# Patient Record
Sex: MALE | Race: OTHER | Employment: OTHER | URBAN - METROPOLITAN AREA
[De-identification: names, ages, dates, MRNs, and addresses within clinical notes are randomized per-mention and may not be internally consistent; named-entity substitution may affect disease eponyms.]

---

## 2017-08-15 NOTE — PROCEDURE NOTE: CLINICAL
PROCEDURE NOTE: Avastin 1.25mg OD. Diagnosis: Neovascular Glaucoma, Severe. Anesthesia: Topical. Prep: Betadine Drops. Prior to injection, risks/benefits/alternatives discussed including infection, loss of vision, hemorrhage, cataract, glaucoma, retinal tears or detachment. The off-label status of Intravitreal Avastin also was reviewed. The patient wished to proceed with treatment. Topical anesthesia was induced with Alcaine. Additional anesthesia was achieved using drop(s) or injection checked above. A drop of Povidone-iodine 5% ophthalmic solution was instilled over the injection site and in the inferior fornix. Betadine prep was performed. Using the syringe provided, Avastin 1.25 mg in 0.05 cc was injected into the vitreous cavity. The needle was passed 3.0 mm posterior to the limbus in pseudophakic patients, and 3.5 mm posterior to the limbus in phakic patients. Patient tolerated procedure well. There were no complications. Injection time: 4:59. Lot #: [email or web address]. Expiration Date: Tue Oct 03 2017 00:00:00 [facility]-[address] Daylight Time). Inventory Id: null. Post-op instructions given. The patient was instructed to return for re-evaluation in approximately 4-12 weeks depending on his/her condition and was told to call immediately if vision decreases and/or if his/her eye becomes red, painful, and/or light sensitive. The patient was instructed to go to the emergency room or call 911 if unable to reach the doctor within an hour or two of trying or calling. The patient was instructed to use Artificial Tears q.i.d. p.r.n for comfort. Francisca Herrera

## 2017-08-15 NOTE — PATIENT DISCUSSION
PATIENT UNDERSTANDS THAT AXIAL LENGHT MEASUREMENTS ARE APPROXIMATE DUE TO CONDITION OF HER EYE, SHE MAY NEED CORRECTIVE GLASSES, AND IN SOME CASES CHANGE OF IOL .

## 2017-08-15 NOTE — PATIENT DISCUSSION
Based on today's exam, diagnostic studies, and/or review of records, the determination was made for treatment today. AVASTIN 1.25mg recommended TODAY after discussion of benefits, risks and alternatives. The injection was given without complication and tolerated well by the patient. Discussed risks, benefits, and alternatives of Intravitreal Avastin. Patient understands and accepts that the Avastin is being used off label. Patient understand and accepts that the Avastin comes from a formulating pharmacy, which may increase the risk of sever vision threatening intraocular infections. Post-injection instructions were reviewed and understood by the patient. Procedure was performed without complications. Instructed to call immediately if any new distortion, blurring, decreased vision or eye pain.

## 2017-08-25 NOTE — PROCEDURE NOTE: CLINICAL
PROCEDURE NOTE: A/C Paracentesis, Therapeutic OD. Diagnosis: Neovascular Glaucoma, Severe. Prior to procedure, risks/benefits/alternatives discussed including infection, loss of vision, hemorrhage, cataract, retinal tears or detachment and patient wished to proceed. All questions asked by the patient were answered in detail. Betadine prep was performed. Location of Paracentesis: Temporal Limbus. Volume of tap: 0.2* ml. Patient tolerated procedure well. There were no complications. Anti-biotic drops were then instilled into the post-operative eye. Post procedure IOP = 5* mmHg. Post procedure instructions given. Patient given office phone number/answering service number and advised to call immediately should there be an increase in floaters or redness, loss of vision or pain, or should they have any other questions or concerns. Hira Rogers PROCEDURE NOTE: Avastin () OD. Diagnosis: PDR. Prior to injection, risks/benefits/alternatives discussed including infection, loss of vision, hemorrhage, cataract, glaucoma, retinal tears or detachment. The off-label status of Intravitreal Avastin also was reviewed. The patient wished to proceed with treatment. Topical anesthesia was induced with Alcaine. Additional anesthesia was achieved using drop(s) or injection checked above. a drop of Povidone-iodine 5% ophthalmic solution was instilled over the injection site and in the inferior fornix. Betadine prep was performed. Using the syringe provided, Avastin 1.25 mg in 0.05 cc was injected into the vitreous cavity. The needle was passed 3.0 mm posterior to the limbus in pseudophakic patients, and 3.5 mm posterior to the lumbus in phakic patients. The remainder of the Avastin in the single-use vial was then discarded in a medical waste disposal container. Unused medication was discarded. Patient tolerated procedure well. There were no complications. Injection time: 4:42PM. Post-op instructions given. The patient was instructed to return for re-evaluation in approximately 4-12 weeks depending on his/her condition and was told to call immediately if vision decreases and/or if his/her eye becomes red, painful, and/or light sensitive. The patient was instructed to go to the emergency room or call 911 if unable to reach the doctor within an hour or two of trying or calling. The patient was instructed to use Artificial Tears q.i.d. p.r.n for comfort. Hira Rogers

## 2018-09-11 ENCOUNTER — NEW PATIENT COMPREHENSIVE (OUTPATIENT)
Dept: URBAN - METROPOLITAN AREA CLINIC 47 | Facility: CLINIC | Age: 63
End: 2018-09-11

## 2018-09-11 DIAGNOSIS — H04.123: ICD-10-CM

## 2018-09-11 DIAGNOSIS — H18.51: ICD-10-CM

## 2018-09-11 DIAGNOSIS — H26.493: ICD-10-CM

## 2018-09-11 DIAGNOSIS — H18.59: ICD-10-CM

## 2018-09-11 DIAGNOSIS — H43.393: ICD-10-CM

## 2018-09-11 PROCEDURE — G9903 PT SCRN TBCO ID AS NON USER: HCPCS

## 2018-09-11 PROCEDURE — 92015 DETERMINE REFRACTIVE STATE: CPT

## 2018-09-11 PROCEDURE — 92134 CPTRZ OPH DX IMG PST SGM RTA: CPT

## 2018-09-11 PROCEDURE — G8428 CUR MEDS NOT DOCUMENT: HCPCS

## 2018-09-11 PROCEDURE — 1036F TOBACCO NON-USER: CPT

## 2018-09-11 PROCEDURE — 92004 COMPRE OPH EXAM NEW PT 1/>: CPT

## 2018-09-11 ASSESSMENT — VISUAL ACUITY
OS_SC: <J12
OD_CC: 20/40-1
OS_CC: 20/40
OD_PH: 20/40
OD_CC: J2
OS_CC: J2
OS_PH: 20/40+2
OS_SC: 20/60-1
OD_SC: 20/200
OD_SC: <J12

## 2018-09-11 ASSESSMENT — TONOMETRY
OD_IOP_MMHG: 12
OS_IOP_MMHG: 12

## 2018-10-03 ENCOUNTER — SURGERY/PROCEDURE (OUTPATIENT)
Dept: URBAN - METROPOLITAN AREA SURGERY 14 | Facility: SURGERY | Age: 63
End: 2018-10-03

## 2018-10-03 ENCOUNTER — ESTABLISHED COMPREHENSIVE EXAM (OUTPATIENT)
Dept: URBAN - METROPOLITAN AREA CLINIC 39 | Facility: CLINIC | Age: 63
End: 2018-10-03

## 2018-10-03 DIAGNOSIS — H26.493: ICD-10-CM

## 2018-10-03 PROCEDURE — 1036F TOBACCO NON-USER: CPT

## 2018-10-03 PROCEDURE — G9903 PT SCRN TBCO ID AS NON USER: HCPCS

## 2018-10-03 PROCEDURE — G8785 BP SCRN NO PERF AT INTERVAL: HCPCS

## 2018-10-03 PROCEDURE — 6682150 YAG CAPSULOTOMY

## 2018-10-03 PROCEDURE — 92014 COMPRE OPH EXAM EST PT 1/>: CPT

## 2018-10-03 PROCEDURE — G8427 DOCREV CUR MEDS BY ELIG CLIN: HCPCS

## 2018-10-03 ASSESSMENT — VISUAL ACUITY
OS_CC: 20/40-2
OD_SC: <J12
OD_CC: 20/40-1
OD_GLARE: 20/80
OS_GLARE: 20/70
OS_CC: J2
OD_SC: 20/200
OS_SC: <J12
OD_CC: J2
OS_SC: 20/60

## 2018-10-03 ASSESSMENT — TONOMETRY
OS_IOP_MMHG: 16
OD_IOP_MMHG: 16

## 2018-10-16 ENCOUNTER — YAG POST-OP (OUTPATIENT)
Dept: URBAN - METROPOLITAN AREA CLINIC 47 | Facility: CLINIC | Age: 63
End: 2018-10-16

## 2018-10-16 DIAGNOSIS — Z98.890: ICD-10-CM

## 2018-10-16 PROCEDURE — 99024 POSTOP FOLLOW-UP VISIT: CPT

## 2018-10-16 ASSESSMENT — TONOMETRY
OS_IOP_MMHG: 14
OD_IOP_MMHG: 15

## 2018-10-16 ASSESSMENT — VISUAL ACUITY
OU_CC: J1
OS_CC: J2
OS_CC: 20/50
OD_CC: J2
OD_CC: 20/50+2

## 2019-04-12 ENCOUNTER — IOP CHECK (OUTPATIENT)
Dept: URBAN - METROPOLITAN AREA CLINIC 39 | Facility: CLINIC | Age: 64
End: 2019-04-12

## 2019-04-12 DIAGNOSIS — H40.053: ICD-10-CM

## 2019-04-12 DIAGNOSIS — Z98.890: ICD-10-CM

## 2019-04-12 DIAGNOSIS — H18.59: ICD-10-CM

## 2019-04-12 PROCEDURE — 92012 INTRM OPH EXAM EST PATIENT: CPT

## 2019-04-12 RX ORDER — FLUOROMETHOLONE 1 MG/ML: 1 SUSPENSION/ DROPS OPHTHALMIC ONCE A DAY

## 2019-04-12 ASSESSMENT — VISUAL ACUITY
OD_CC: J1
OD_CC: 20/40-1
OS_CC: 20/50+1
OU_CC: J1+
OS_CC: J1
OU_CC: 20/40

## 2019-04-12 ASSESSMENT — TONOMETRY
OD_IOP_MMHG: 13
OS_IOP_MMHG: 14
OS_IOP_MMHG: 15
OD_IOP_MMHG: 18

## 2019-10-15 ENCOUNTER — ESTABLISHED COMPREHENSIVE EXAM (OUTPATIENT)
Dept: URBAN - METROPOLITAN AREA CLINIC 38 | Facility: CLINIC | Age: 64
End: 2019-10-15

## 2019-10-15 DIAGNOSIS — H40.053: ICD-10-CM

## 2019-10-15 DIAGNOSIS — H18.51: ICD-10-CM

## 2019-10-15 DIAGNOSIS — H43.393: ICD-10-CM

## 2019-10-15 DIAGNOSIS — H04.123: ICD-10-CM

## 2019-10-15 DIAGNOSIS — H18.59: ICD-10-CM

## 2019-10-15 PROCEDURE — 92015 DETERMINE REFRACTIVE STATE: CPT

## 2019-10-15 PROCEDURE — 92014 COMPRE OPH EXAM EST PT 1/>: CPT

## 2019-10-15 ASSESSMENT — TONOMETRY
OD_IOP_MMHG: 16
OS_IOP_MMHG: 16

## 2019-10-15 ASSESSMENT — VISUAL ACUITY
OS_CC: J1
OD_CC: 20/25
OD_CC: J1
OU_CC: J1+
OU_CC: 20/20
OS_CC: 20/30+1

## 2020-10-27 ENCOUNTER — ESTABLISHED COMPREHENSIVE EXAM (OUTPATIENT)
Dept: URBAN - METROPOLITAN AREA CLINIC 38 | Facility: CLINIC | Age: 65
End: 2020-10-27

## 2020-10-27 DIAGNOSIS — H18.593: ICD-10-CM

## 2020-10-27 DIAGNOSIS — H43.393: ICD-10-CM

## 2020-10-27 DIAGNOSIS — H40.053: ICD-10-CM

## 2020-10-27 DIAGNOSIS — H52.03: ICD-10-CM

## 2020-10-27 DIAGNOSIS — H43.811: ICD-10-CM

## 2020-10-27 DIAGNOSIS — H04.123: ICD-10-CM

## 2020-10-27 DIAGNOSIS — H18.513: ICD-10-CM

## 2020-10-27 DIAGNOSIS — T15.01XA: ICD-10-CM

## 2020-10-27 PROCEDURE — 92015 DETERMINE REFRACTIVE STATE: CPT

## 2020-10-27 PROCEDURE — 92014 COMPRE OPH EXAM EST PT 1/>: CPT

## 2020-10-27 PROCEDURE — 65222 REMOVE FOREIGN BODY FROM EYE: CPT

## 2020-10-27 RX ORDER — MINERAL OIL 2; 2 MG/.4ML; MG/.4ML: 1 EMULSION OPHTHALMIC

## 2020-10-27 RX ORDER — AMOXICILLIN 500 MG/1: 1 CAPSULE ORAL

## 2020-10-27 ASSESSMENT — TONOMETRY
OS_IOP_MMHG: 18
OD_IOP_MMHG: 18

## 2020-10-27 ASSESSMENT — VISUAL ACUITY
OS_CC: 20/25
OD_CC: J2
OS_CC: J2
OD_CC: 20/30

## 2021-10-12 ENCOUNTER — ESTABLISHED COMPREHENSIVE EXAM (OUTPATIENT)
Dept: URBAN - METROPOLITAN AREA CLINIC 38 | Facility: CLINIC | Age: 66
End: 2021-10-12

## 2021-10-12 DIAGNOSIS — H43.393: ICD-10-CM

## 2021-10-12 DIAGNOSIS — H40.053: ICD-10-CM

## 2021-10-12 DIAGNOSIS — H43.812: ICD-10-CM

## 2021-10-12 DIAGNOSIS — H04.123: ICD-10-CM

## 2021-10-12 DIAGNOSIS — H43.811: ICD-10-CM

## 2021-10-12 DIAGNOSIS — H52.03: ICD-10-CM

## 2021-10-12 DIAGNOSIS — H18.593: ICD-10-CM

## 2021-10-12 DIAGNOSIS — H18.513: ICD-10-CM

## 2021-10-12 PROCEDURE — 92014 COMPRE OPH EXAM EST PT 1/>: CPT

## 2021-10-12 PROCEDURE — 92015 DETERMINE REFRACTIVE STATE: CPT

## 2021-10-12 ASSESSMENT — VISUAL ACUITY
OS_CC: J2
OU_CC: J2
OS_SC: 20/60
OU_SC: <J12
OS_CC: 20/25-2
OU_SC: 20/50-1
OD_SC: 20/200
OU_CC: 20/20-1
OS_SC: <J12
OD_SC: <J12
OD_CC: J3
OD_CC: 20/25-1

## 2021-10-12 ASSESSMENT — TONOMETRY
OS_IOP_MMHG: 15
OD_IOP_MMHG: 15

## 2023-03-12 NOTE — PATIENT DISCUSSION
8/25/17, ON B SCAN SUSPICIOUS FOR FUNNEL RD AND VIT HEME.  DIFFICULT DUE TO DENSE HEME.
AVASTIN 1.25 MG OD TODAY. SEE PDR.
BMI above normal limits, recommended weight loss, improve diet and follow up with internist.
Based on patient's symptoms, exam, diagnostic testing and records the following are my findings and recommendations.
Based on today's exam, diagnostic studies, and/or review of records, the determination was made for treatment today. AVASTIN 1.25mg recommended TODAY after discussion of benefits, risks and alternatives. The injection was given without complication and tolerated well by the patient. Discussed risks, benefits, and alternatives of Intravitreal Avastin. Patient understands and accepts that the Avastin is being used off label. Patient understand and accepts that the Avastin comes from a formulating pharmacy, which may increase the risk of sever vision threatening intraocular infections. Post-injection instructions were reviewed and understood by the patient. Procedure was performed without complications. Instructed to call immediately if any new distortion, blurring, decreased vision or eye pain.
Discussed the importance of blood sugar and blood pressure control and keeping the HA1C < 6.5%.
Discussed the importance of blood sugar control in the prevention of ocular complications.
Does NOT APPEAR VISUALLY SIGNIFICANT.
HYPHEMA AND VIT HEME.
NOW WITH MARSHAL.
No retinal holes or tears seen on exam. Recommended observation. We reviewed the signs and symptoms of retinal tear/retinal detachment and the importance of prompt evaluation should there be increasing floaters, new flashing lights, or decreasing peripheral vision in either eye at any time. Patient understands condition, prognosis and need for follow up care.
PARACENTHESIS TO BRING IOP DOWN , THEN INJ AVASTIN. POST PARACENTHESIS IOP WAS 5.
PLAN LUCENTIS 0.3 MG WITHIN 2 WEEKS.
PLAN TX OF ME.
Recommended OBSERVATION and continued MONITORING for progression.
THEN WILL GET SHUNT.
WILL TX NVG FIRST.
WITH HYPHEMA. ELEVATED IOP. CANNOT TOLERATE DIAMOX. RISK OF LOOSING VISION.
Md rosado

## 2023-05-19 ENCOUNTER — COMPREHENSIVE EXAM (OUTPATIENT)
Dept: URBAN - METROPOLITAN AREA CLINIC 36 | Facility: CLINIC | Age: 68
End: 2023-05-19

## 2023-05-19 DIAGNOSIS — H04.123: ICD-10-CM

## 2023-05-19 DIAGNOSIS — H52.03: ICD-10-CM

## 2023-05-19 DIAGNOSIS — H18.593: ICD-10-CM

## 2023-05-19 DIAGNOSIS — H18.513: ICD-10-CM

## 2023-05-19 PROCEDURE — 92015 DETERMINE REFRACTIVE STATE: CPT

## 2023-05-19 PROCEDURE — 92014 COMPRE OPH EXAM EST PT 1/>: CPT

## 2023-05-19 ASSESSMENT — VISUAL ACUITY
OS_CC: J3
OD_CC: 20/40-2
OD_CC: J2
OD_SC: >J10
OS_SC: >J10
OS_SC: 20/200-1
OD_SC: 20/400
OS_CC: 20/40-2

## 2023-05-19 ASSESSMENT — TONOMETRY
OD_IOP_MMHG: 14
OS_IOP_MMHG: 13

## 2024-05-02 ENCOUNTER — COMPREHENSIVE EXAM (OUTPATIENT)
Dept: URBAN - METROPOLITAN AREA CLINIC 36 | Facility: CLINIC | Age: 69
End: 2024-05-02

## 2024-05-02 DIAGNOSIS — H52.7: ICD-10-CM

## 2024-05-02 DIAGNOSIS — H18.593: ICD-10-CM

## 2024-05-02 DIAGNOSIS — H04.123: ICD-10-CM

## 2024-05-02 DIAGNOSIS — H18.513: ICD-10-CM

## 2024-05-02 PROCEDURE — 92015 DETERMINE REFRACTIVE STATE: CPT

## 2024-05-02 PROCEDURE — 92014 COMPRE OPH EXAM EST PT 1/>: CPT

## 2024-05-02 ASSESSMENT — VISUAL ACUITY
OD_CC: 20/25-2
OS_CC: 20/30
OS_SC: 20/400
OD_CC: J3
OD_SC: <J12
OS_CC: J3
OS_SC: <J12

## 2024-05-02 ASSESSMENT — TONOMETRY
OS_IOP_MMHG: 13
OD_IOP_MMHG: 12

## 2025-05-02 ENCOUNTER — COMPREHENSIVE EXAM (OUTPATIENT)
Age: 70
End: 2025-05-02

## 2025-05-02 DIAGNOSIS — H04.123: ICD-10-CM

## 2025-05-02 DIAGNOSIS — H18.513: ICD-10-CM

## 2025-05-02 DIAGNOSIS — H35.372: ICD-10-CM

## 2025-05-02 DIAGNOSIS — H52.7: ICD-10-CM

## 2025-05-02 DIAGNOSIS — H18.593: ICD-10-CM

## 2025-05-02 PROCEDURE — 92014 COMPRE OPH EXAM EST PT 1/>: CPT

## 2025-05-02 PROCEDURE — 92015 DETERMINE REFRACTIVE STATE: CPT
